# Patient Record
Sex: FEMALE
[De-identification: names, ages, dates, MRNs, and addresses within clinical notes are randomized per-mention and may not be internally consistent; named-entity substitution may affect disease eponyms.]

---

## 2017-12-27 NOTE — PCM.OPNOTE
- General Post-Op/Procedure Note


Date of Surgery/Procedure: 12/27/17


Operative Procedure(s): right carpal tunnel release


Pre Op Diagnosis: right carpal tunnel


Post-Op Diagnosis: Same


Anesthesia Technique: Local, MAC


Primary Surgeon: Airam Lai


Assistant: Kaleigh Maldonado


Complications: None


Condition: Good

## 2017-12-27 NOTE — PCM.PREANE
Preanesthetic Assessment





- Procedure


Proposed Procedure: 





Carpal tunnel decompression, right








- Anesthesia/Transfusion/Family Hx


Anesthesia History: Prior Anesthesia Without Reaction


Family History of Anesthesia Reaction: No


Transfusion History: No Prior Transfusion(s)


Additional History: 





cervical, thoracic, and lumbar pains, chronic and acute








- Review of Systems


General: No Symptoms, Other (obesity)


Pulmonary: No Symptoms


Cardiovascular: No Symptoms


Gastrointestinal: Other (uses nexium)


Neurological: Numbness (right hand), Other (see spinal column problems above)





- Physical Assessment


NPO Status Date: 12/26/17


NPO Status Time: 22:00


Height: 5 ft 4 in


Weight: 235 lb


ASA Class: 3


Mental Status: Alert & Oriented x3


Airway Class: Mallampati = 2


Dentition: Reports: Normal Dentition


Thyro-Mental Finger Breadths: 3 (short neck)


Mouth Opening Finger Breadths: 3


ROM/Head Extension: Limited/Partial


Lungs: Clear to Auscultation, Normal Respiratory Effort


Cardiovascular: Regular Rate, Regular Rhythm, No Murmurs





- Allergies


Allergies/Adverse Reactions: 


 Allergies











Allergy/AdvReac Type Severity Reaction Status Date / Time


 


No Known Allergies Allergy   Verified 11/10/16 17:22














- Blood


Blood Available: No


Product(s) Available: None





- Anesthesia Plan


Pre-Op Medication Ordered: None





- Acknowledgements


Anesthesia Type Planned: MAC


Pt an Appropriate Candidate for the Planned Anesthesia: Yes


Alternatives and Risks of Anesthesia Discussed w Pt/Guardian: Yes


Pt/Guardian Understands and Agrees with Anesthesia Plan: Yes





PreAnesthesia Questionnaire





- Past Health History


Medical/Surgical History: Denies Medical/Surgical History


Gastrointestinal History: Reports: GERD


Genitourinary History: Reports: None


OB/GYN History: Reports: Pregnancy


Musculoskeletal History: Reports: Arthritis, Back Pain, Chronic, Neck Pain, 

Chronic


Other Musculoskeletal History: degenerative disc disease


Neurological History: Reports: Migraines, Seizure, Other (See Below)


Other Neuro History: steroid injections in C-sping, L-spine, seizure 2 years ago


Psychiatric History: Reports: Anxiety, Depression


Endocrine/Metabolic History: Reports: Obesity/BMI 30+


Dermatologic History: Reports: Other (See Below)


Other Dermatologic History: acne





- Infectious Disease History


Infectious Disease History: Reports: Chicken Pox





- Past Surgical History


Head Surgeries/Procedures: Reports: None


Neurological Surgical History: Reports: C-Spine


Other Neurological Surgeries/Procedures: hx neck surgery


Musculoskeletal Surgical History: Reports: Carpal Tunnel





- SUBSTANCE USE


Smoking Status *Q: Former Smoker


Tobacco Use Within Last Twelve Months: Cigarettes


Days Per Week of Alcohol Use: 0


Recreational Drug Use History: No





- HOME MEDS


Home Medications: 


 Home Meds





Cyanocobalamin/FA/Pyridoxine [Folbic Tablet] 1 tab PO BID 12/21/17 [History]


Cyclobenzaprine HCl 10 mg PO BID PRN 12/21/17 [History]


Diclofenac Sodium [Voltaren 1% Gel] 1 applic TOP ASDIRECTED PRN 12/21/17 [

History]


Esomeprazole Magnesium [Nexium] 2 tab PO DAILY 12/21/17 [History]


Ketamine Hcl Powder 1 applic TOP ASDIRECTED PRN 12/21/17 [History]


Minocycline HCl 100 mg PO BID 12/21/17 [History]


Omega-3/DHA/Epa/Fish Oil [Omega 3 500 Softgel] 680 mg PO BID 12/21/17 [History]


Sertraline HCl 50 mg PO DAILY 12/21/17 [History]











- CURRENT (IN HOUSE) MEDS


Current Meds: 





 Current Medications





Hydrocodone Bitart/Acetaminophen (Norco 325-5 Mg)  1 tab PO Q4H PRN


   PRN Reason: Pain


Lactated Ringer's (Ringers, Lactated)  1,000 mls @ 125 mls/hr IV ASDIRECTED DANNIE





Discontinued Medications





Bupivacaine HCl/Epinephrine Bitart (Marcaine 0.25%/Epinephrine 1:200,000)  10 

ml INJECT ONETIME ONE


   Stop: 12/27/17 08:02


Bupivacaine HCl/Epinephrine Bitart (Sensorc Mpf 0.25%-Epi 1:235217) Confirm 

Administered Dose 30 mls @ as directed .ROUTE .STK-MED ONE


   Stop: 12/27/17 07:20


Cefazolin Sodium/Dextrose 2 gm (/ Premix)  50 mls @ 100 mls/hr IV ONETIME ONE


   Stop: 12/27/17 08:30

## 2017-12-27 NOTE — PCM48HPAN
Post Anesthesia Note





- EVALUATION WITHIN 48HRS OF ANESTHETIC


Vital Signs in Normal Range: Yes


Patient Participated in Evaluation: Yes


Respiratory Function Stable: Yes


Airway Patent: Yes


Cardiovascular Function Stable: Yes


Hydration Status Stable: Yes


Pain Control Satisfactory: Yes


Nausea and Vomiting Control Satisfactory: Yes


Mental Status Recovered: Yes





- COMMENTS/OBSERVATIONS


Free Text/Narrative:: 





Comfortable, warm pink and dry, leaving

## 2017-12-27 NOTE — PCM.POSTAN
POST ANESTHESIA ASSESSMENT





- MENTAL STATUS


Mental Status: Alert, Oriented





- VITAL SIGNS


Pulse Rate: 85


SaO2: 95 (room air)


Resp Rate: 12


Blood Pressure: 125/73





- RESPIRATORY


Respiratory Status: Respiratory Rate WNL, Airway Patent, O2 Saturation Stable





- CARDIOVASCULAR


CV Status: Pulse Rate WNL, Blood Pressure Stable





- GASTROINTESTINAL


GI Status: No Symptoms





- PAIN


Pain Score: 0





- POST OP HYDRATION


Hydration Status: Adequate & Stable

## 2017-12-28 NOTE — OR
SURGEON:

TING LARIOS MD

 

DATE OF PROCEDURE:  12/27/2017

 

PREOPERATIVE DIAGNOSIS:

Right carpal tunnel syndrome.

 

POSTOPERATIVE DIAGNOSIS:

Right carpal tunnel syndrome.

 

PROCEDURE:

Right carpal tunnel release.

 

ASSISTANT:

ROXANNE Bradford

 

INDICATIONS:

Ms. Holland is a 37-year-old female with carpal tunnel syndrome on the right.

Risks and benefits of release were discussed with her and she was in agreement

to proceed.  Risks were including, but not limited to, bleeding, infection,

damage to underlying or overlying structures, possible need for future

interventions, possible scarring.

 

PROCEDURE IN DETAIL:

After informed consent was obtained and placed on the chart, the patient was

brought to the operating theater and laid in supine position.  After adequate

local MAC anesthetic was obtained, the area was prepped and draped and a time-

out was completed to confirm side and site.

 

The arm was exsanguinated and tourniquet was inflated to 200 mmHg.  Dissection

was then taken on the transverse carpal ligament area and a 15 blade was used to

dissect through skin and subcutaneous tissues until reach of the ligament.

Dissection was then carried distally and proximally under direct visualization

until complete release of the ligament.  The wound was copiously irrigated and

closed using 5-0 nylon stitch in a horizontal mattress fashion.  She tolerated

this well.  All counts and needles were correct at the end of the case.

 

The wound was dressed with Xeroform, fluffs, and a Kerlix gauze dressing, and a

2-inch Ace wrap.

 

FOLLOWUP INSTRUCTIONS:

The patient will see us in 10 to 14 days, sooner if any problems, questions, or

concerns and was given a prescription for pain control.

 

 

JAVAD / BRENNON

DD:  12/28/2017 17:13:50

DT:  12/28/2017 20:34:05

Job #:  [id number]

## 2021-04-11 ENCOUNTER — HOSPITAL ENCOUNTER (EMERGENCY)
Dept: HOSPITAL 56 - MW.ED | Age: 41
Discharge: HOME | End: 2021-04-11
Payer: COMMERCIAL

## 2021-04-11 VITALS — DIASTOLIC BLOOD PRESSURE: 71 MMHG | HEART RATE: 83 BPM | SYSTOLIC BLOOD PRESSURE: 128 MMHG

## 2021-04-11 DIAGNOSIS — E66.9: ICD-10-CM

## 2021-04-11 DIAGNOSIS — K92.2: Primary | ICD-10-CM

## 2021-04-11 LAB
BUN SERPL-MCNC: 10 MG/DL (ref 7–18)
CHLORIDE SERPL-SCNC: 103 MMOL/L (ref 98–107)
CO2 SERPL-SCNC: 26.6 MMOL/L (ref 21–32)
GLUCOSE SERPL-MCNC: 87 MG/DL (ref 74–106)
LIPASE SERPL-CCNC: 132 U/L (ref 73–393)
POTASSIUM SERPL-SCNC: 4.1 MMOL/L (ref 3.5–5.1)
SODIUM SERPL-SCNC: 140 MMOL/L (ref 136–145)

## 2021-04-11 PROCEDURE — 81003 URINALYSIS AUTO W/O SCOPE: CPT

## 2021-04-11 PROCEDURE — 83735 ASSAY OF MAGNESIUM: CPT

## 2021-04-11 PROCEDURE — 83690 ASSAY OF LIPASE: CPT

## 2021-04-11 PROCEDURE — 85025 COMPLETE CBC W/AUTO DIFF WBC: CPT

## 2021-04-11 PROCEDURE — 80053 COMPREHEN METABOLIC PANEL: CPT

## 2021-04-11 PROCEDURE — 99285 EMERGENCY DEPT VISIT HI MDM: CPT

## 2021-04-11 PROCEDURE — 85610 PROTHROMBIN TIME: CPT

## 2021-04-11 PROCEDURE — 36415 COLL VENOUS BLD VENIPUNCTURE: CPT

## 2021-04-11 PROCEDURE — 85730 THROMBOPLASTIN TIME PARTIAL: CPT

## 2021-04-11 PROCEDURE — 84703 CHORIONIC GONADOTROPIN ASSAY: CPT

## 2021-04-11 PROCEDURE — 74177 CT ABD & PELVIS W/CONTRAST: CPT

## 2021-04-11 NOTE — CT
Indication:



 Left upper and left lower quadrant pain. 



Technique:



 Contrast enhanced CT abdomen and pelvis with 100 mL Isovue 370 



Comparison:



 No comparison 



. 



Findings:



The heart size is normal. Lung bases are clear. 



Fatty appearance of the liver. Gallbladder spleen pancreas adrenal glands 

are unremarkable. Normal caliber abdominal aorta. 



Symmetric enhancement both kidneys normal appendix. Urinary bladder is 

unremarkable. Bowel is unremarkable. No inflammatory change. No suspicious 

bony lesions. 



Impression:



 1. No acute findings in the abdomen or pelvis.



Please note that all CT scans at this facility use dose modulation, 

iterative reconstruction, and/or weight-based dosing when appropriate to 

reduce radiation dose to as low as reasonably achievable.



Dictated by Debbi Dowd MD @ Apr 11 2021  4:52PM



Signed by Dr. Debbi Dowd @ Apr 11 2021  4:56PM

## 2021-04-11 NOTE — EDM.PDOC
ED HPI GENERAL MEDICAL PROBLEM





- General


Chief Complaint: Abdominal Pain


Stated Complaint: NOT FEELING WELL


Time Seen by Provider: 04/11/21 13:15


Source of Information: Reports: Patient


History Limitations: Reports: No Limitations





- History of Present Illness


INITIAL COMMENTS - FREE TEXT/NARRATIVE: 





Is a 41-year-old female who presents today for abdominal cramps and bloody stool

.  Patient has history of hemorrhoids and occasionally has bright red blood in 

her stool without that the blood may look little different than before.  Patient

reports that she does feel little tired than normal but denies feeling pale 

having vomiting or other complaints.  Patient denies being on any blood 

thinners.


  ** abdomen


Pain Score (Numeric/FACES): 2





- Related Data


                                    Allergies











Allergy/AdvReac Type Severity Reaction Status Date / Time


 


No Known Allergies Allergy   Verified 04/11/21 14:57











Home Meds: 


                                    Home Meds





. [No Known Home Meds]  04/11/21 [History]











Past Medical History





- Past Health History


Medical/Surgical History: Denies Medical/Surgical History


Gastrointestinal History: Reports: GERD


Genitourinary History: Reports: None


OB/GYN History: Reports: Pregnancy


Musculoskeletal History: Reports: Arthritis, Back Pain, Chronic, Neck Pain, 

Chronic


Other Musculoskeletal History: degenerative disc disease


Neurological History: Reports: Migraines, Seizure, Other (See Below)


Other Neuro History: steroid injections in C-sping, L-spine, seizure 2 years ago


Psychiatric History: Reports: Anxiety, Depression


Endocrine/Metabolic History: Reports: Obesity/BMI 30+


Dermatologic History: Reports: Other (See Below)


Other Dermatologic History: acne





- Infectious Disease History


Infectious Disease History: Reports: Chicken Pox





- Past Surgical History


Head Surgeries/Procedures: Reports: None


Neurological Surgical History: Reports: C-Spine


Other Neurological Surgeries/Procedures: hx neck surgery


Musculoskeletal Surgical History: Reports: Carpal Tunnel





Social & Family History





- Family History


Family Medical History: No Pertinent Family History


Respiratory: Reports: TB





- Caffeine Use


Caffeine Use: Reports: Coffee





ED ROS GENERAL





- Review of Systems


Review Of Systems: See Below


Constitutional: Reports: No Symptoms


HEENT: Reports: No Symptoms


Respiratory: Reports: No Symptoms


Cardiovascular: Reports: No Symptoms


Endocrine: Reports: No Symptoms


GI/Abdominal: Reports: Bloody Stool


: Reports: No Symptoms


Musculoskeletal: Reports: No Symptoms


Skin: Reports: No Symptoms


Neurological: Reports: No Symptoms


Psychiatric: Reports: No Symptoms


Hematologic/Lymphatic: Reports: No Symptoms


Immunologic: Reports: No Symptoms





ED EXAM, GI/ABD





- Physical Exam


Exam: See Below


Exam Limited By: No Limitations


General Appearance: Alert, WD/WN


Respiratory/Chest: No Respiratory Distress, Lungs Clear, Normal Breath Sounds


Cardiovascular: Normal Peripheral Pulses, Regular Rate, Rhythm


GI/Abdominal Exam: Normal Bowel Sounds, Soft, Non-Tender


Extremities: Normal Inspection, Normal Range of Motion


Neurological: Alert, Oriented, Normal Cognition





Course





- Vital Signs


Last Recorded V/S: 


                                Last Vital Signs











Temp  97 F   04/11/21 13:00


 


Pulse  84   04/11/21 15:00


 


Resp  16   04/11/21 15:00


 


BP  133/87   04/11/21 15:00


 


Pulse Ox  99   04/11/21 15:00














- Orders/Labs/Meds


Labs: 


                                Laboratory Tests











  04/11/21 04/11/21 04/11/21 Range/Units





  13:28 13:28 13:28 


 


WBC  10.89    (4.0-11.0)  K/uL


 


RBC  4.96    (4.30-5.90)  M/uL


 


Hgb  13.7    (12.0-16.0)  g/dL


 


Hct  43.0    (36.0-46.0)  %


 


MCV  86.7    (80.0-98.0)  fL


 


MCH  27.6    (27.0-32.0)  pg


 


MCHC  31.9    (31.0-37.0)  g/dL


 


RDW Std Deviation  46.2    (28.0-62.0)  fl


 


RDW Coeff of Romaine  15    (11.0-15.0)  %


 


Plt Count  382    (150-400)  K/uL


 


MPV  10.30    (7.40-12.00)  fL


 


Neut % (Auto)  56.2    (48.0-80.0)  %


 


Lymph % (Auto)  33.8    (16.0-40.0)  %


 


Mono % (Auto)  8.4    (0.0-15.0)  %


 


Eos % (Auto)  1.2    (0.0-7.0)  %


 


Baso % (Auto)  0.4    (0.0-1.5)  %


 


Neut # (Auto)  6.1 H    (1.4-5.7)  K/uL


 


Lymph # (Auto)  3.7 H    (0.6-2.4)  K/uL


 


Mono # (Auto)  0.9 H    (0.0-0.8)  K/uL


 


Eos # (Auto)  0.1    (0.0-0.7)  K/uL


 


Baso # (Auto)  0.0    (0.0-0.1)  K/uL


 


Nucleated RBC %  0.0    /100WBC


 


Nucleated RBCs #  0    K/uL


 


INR     


 


APTT     (18.6-31.3)  SEC


 


Sodium   140   (136-145)  mmol/L


 


Potassium   4.1   (3.5-5.1)  mmol/L


 


Chloride   103   ()  mmol/L


 


Carbon Dioxide   26.6   (21.0-32.0)  mmol/L


 


BUN   10   (7.0-18.0)  mg/dL


 


Creatinine   0.8   (0.6-1.0)  mg/dL


 


Est Cr Clr Drug Dosing   TNP   


 


Estimated GFR (MDRD)   > 60.0   ml/min


 


Glucose   87   ()  mg/dL


 


Calcium   9.0   (8.5-10.1)  mg/dL


 


Magnesium   2.1   (1.8-2.4)  mg/dL


 


Total Bilirubin   0.3   (0.2-1.0)  mg/dL


 


AST   17   (15-37)  IU/L


 


ALT   35   (14-63)  IU/L


 


Alkaline Phosphatase   70   ()  U/L


 


Total Protein   7.9   (6.4-8.2)  g/dL


 


Albumin   4.1   (3.4-5.0)  g/dL


 


Globulin   3.8   (2.6-4.0)  g/dL


 


Albumin/Globulin Ratio   1.1   (0.9-1.6)  


 


Lipase   132   ()  U/L


 


HCG, Qual    NEGATIVE  (NEG)  


 


Urine Color     


 


Urine Appearance     


 


Urine pH     (5.0-8.0)  


 


Ur Specific Gravity     (1.001-1.035)  


 


Urine Protein     (NEGATIVE)  mg/dL


 


Urine Glucose (UA)     (NEGATIVE)  mg/dL


 


Urine Ketones     (NEGATIVE)  mg/dL


 


Urine Occult Blood     (NEGATIVE)  


 


Urine Nitrite     (NEGATIVE)  


 


Urine Bilirubin     (NEGATIVE)  


 


Urine Urobilinogen     (<2.0)  EU/dL


 


Ur Leukocyte Esterase     (NEGATIVE)  














  04/11/21 04/11/21 Range/Units





  13:52 15:04 


 


WBC    (4.0-11.0)  K/uL


 


RBC    (4.30-5.90)  M/uL


 


Hgb    (12.0-16.0)  g/dL


 


Hct    (36.0-46.0)  %


 


MCV    (80.0-98.0)  fL


 


MCH    (27.0-32.0)  pg


 


MCHC    (31.0-37.0)  g/dL


 


RDW Std Deviation    (28.0-62.0)  fl


 


RDW Coeff of Romaine    (11.0-15.0)  %


 


Plt Count    (150-400)  K/uL


 


MPV    (7.40-12.00)  fL


 


Neut % (Auto)    (48.0-80.0)  %


 


Lymph % (Auto)    (16.0-40.0)  %


 


Mono % (Auto)    (0.0-15.0)  %


 


Eos % (Auto)    (0.0-7.0)  %


 


Baso % (Auto)    (0.0-1.5)  %


 


Neut # (Auto)    (1.4-5.7)  K/uL


 


Lymph # (Auto)    (0.6-2.4)  K/uL


 


Mono # (Auto)    (0.0-0.8)  K/uL


 


Eos # (Auto)    (0.0-0.7)  K/uL


 


Baso # (Auto)    (0.0-0.1)  K/uL


 


Nucleated RBC %    /100WBC


 


Nucleated RBCs #    K/uL


 


INR  0.98   


 


APTT  24.8   (18.6-31.3)  SEC


 


Sodium    (136-145)  mmol/L


 


Potassium    (3.5-5.1)  mmol/L


 


Chloride    ()  mmol/L


 


Carbon Dioxide    (21.0-32.0)  mmol/L


 


BUN    (7.0-18.0)  mg/dL


 


Creatinine    (0.6-1.0)  mg/dL


 


Est Cr Clr Drug Dosing    


 


Estimated GFR (MDRD)    ml/min


 


Glucose    ()  mg/dL


 


Calcium    (8.5-10.1)  mg/dL


 


Magnesium    (1.8-2.4)  mg/dL


 


Total Bilirubin    (0.2-1.0)  mg/dL


 


AST    (15-37)  IU/L


 


ALT    (14-63)  IU/L


 


Alkaline Phosphatase    ()  U/L


 


Total Protein    (6.4-8.2)  g/dL


 


Albumin    (3.4-5.0)  g/dL


 


Globulin    (2.6-4.0)  g/dL


 


Albumin/Globulin Ratio    (0.9-1.6)  


 


Lipase    ()  U/L


 


HCG, Qual    (NEG)  


 


Urine Color   YELLOW  


 


Urine Appearance   CLEAR  


 


Urine pH   5.5  (5.0-8.0)  


 


Ur Specific Gravity   >= 1.030  (1.001-1.035)  


 


Urine Protein   NEGATIVE  (NEGATIVE)  mg/dL


 


Urine Glucose (UA)   NEGATIVE  (NEGATIVE)  mg/dL


 


Urine Ketones   NEGATIVE  (NEGATIVE)  mg/dL


 


Urine Occult Blood   NEGATIVE  (NEGATIVE)  


 


Urine Nitrite   NEGATIVE  (NEGATIVE)  


 


Urine Bilirubin   NEGATIVE  (NEGATIVE)  


 


Urine Urobilinogen   0.2  (<2.0)  EU/dL


 


Ur Leukocyte Esterase   NEGATIVE  (NEGATIVE)  











Meds: 


Medications














Discontinued Medications














Generic Name Dose Route Start Last Admin





  Trade Name Freq  PRN Reason Stop Dose Admin


 


Iopamidol  100 ml  04/11/21 15:41  04/11/21 15:41





  Iopamidol 755 Mg/Ml 500 Ml Multipack Bottle  IVPUSH  04/11/21 15:42  100 ml





  ONETIME ONE   Administration














- Re-Assessments/Exams


Free Text/Narrative Re-Assessment/Exam: 





04/11/21 17:01


Patient hemoglobin is stable.  Patient had no bright red blood or dark stool on 

rectal exam was guaiac positive.  Patient CT is negative.  Patient will be 

referred to follow-up with GI as outpatient.





Departure





- Departure


Time of Disposition: 17:02


Disposition: Home, Self-Care 01


Condition: Good


Clinical Impression: 


 GI bleed








- Discharge Information


*PRESCRIPTION DRUG MONITORING PROGRAM REVIEWED*: Not Applicable


*COPY OF PRESCRIPTION DRUG MONITORING REPORT IN PATIENT MILO: Not Applicable


Instructions:  Gastrointestinal Bleeding, Easy-to-Read


Referrals: 


PCP,None [Primary Care Provider] - 


Forms:  ED Department Discharge


Additional Instructions: 


The following information is given to patients seen in the emergency department 

who are being discharged to home. This information is to outline your options 

for follow-up care. We provide all patients seen in our emergency department 

with a follow-up referral.





The need for follow-up, as well as the timing and circumstances, are variable 

depending upon the specifics of your emergency department visit.





If you don't have a primary care physician on staff, we will provide you with a 

referral. We always advise you to contact your personal physician following an 

emergency department visit to inform them of the circumstance of the visit and 

for follow-up with them and/or the need for any referrals to a consulting 

specialist.





The emergency department will also refer you to a specialist when appropriate. 

This referral assures that you have the opportunity for follow-up care with a 

specialist. All of these measure are taken in an effort to provide you with 

optimal care, which includes your follow-up.





Under all circumstances we always encourage you to contact your private 

physician who remains a resource for coordinating your care. When calling for 

follow-up care, please make the office aware that this follow-up is from your 

recent emergency room visit. If for any reason you are refused follow-up, please

contact the [facility] Emergency Department

at [phone] and asked to speak to the emergency department charge nurse.





Please follow up with your primary care physician. If you do not have a primary 

care physician, see below:





[facility]


[address], [address]


[place] [address]


Phone: [phone]





Please call the number above and follow-up for your possible blood in your 

stool.  If you have any increased bleeding weakness or other complaints please 

return to the ED.  We wilton your labs your hemoglobin is stable and all your 

vitals are stable as well and your CAT scan was normal.








Sepsis Event Note (ED)





- Focused Exam


Vital Signs: 


                                   Vital Signs











  Temp Pulse Resp BP Pulse Ox


 


 04/11/21 15:00   84  16  133/87  99


 


 04/11/21 13:00  97 F  91  17  139/109 H  97














- Assessment/Plan


Plan: 





Patient is a 41-year-old female who presents today for possible blood in stool. 

Patient does have a history of hemorrhoids.  The bleeding could be coming from 

there but she feels that the blood is a different color than what it normally 

is.  Will obtain labs for next hemoglobin and reassess.

## 2021-04-21 ENCOUNTER — HOSPITAL ENCOUNTER (OUTPATIENT)
Dept: HOSPITAL 56 - MW.SDS | Age: 41
Discharge: HOME | End: 2021-04-21
Attending: SURGERY
Payer: COMMERCIAL

## 2021-04-21 VITALS — DIASTOLIC BLOOD PRESSURE: 70 MMHG | SYSTOLIC BLOOD PRESSURE: 119 MMHG | HEART RATE: 66 BPM

## 2021-04-21 DIAGNOSIS — M79.18: ICD-10-CM

## 2021-04-21 DIAGNOSIS — Z98.890: ICD-10-CM

## 2021-04-21 DIAGNOSIS — Z79.899: ICD-10-CM

## 2021-04-21 DIAGNOSIS — K92.1: Primary | ICD-10-CM

## 2021-04-21 DIAGNOSIS — K22.8: ICD-10-CM

## 2021-04-21 DIAGNOSIS — K29.50: ICD-10-CM

## 2021-04-21 DIAGNOSIS — E66.9: ICD-10-CM

## 2021-04-21 DIAGNOSIS — F17.200: ICD-10-CM

## 2021-04-21 DIAGNOSIS — K64.4: ICD-10-CM

## 2021-04-21 DIAGNOSIS — K44.9: ICD-10-CM

## 2021-04-21 DIAGNOSIS — G89.29: ICD-10-CM

## 2021-04-21 PROCEDURE — 45378 DIAGNOSTIC COLONOSCOPY: CPT

## 2021-04-21 PROCEDURE — 81025 URINE PREGNANCY TEST: CPT

## 2021-04-21 PROCEDURE — 43239 EGD BIOPSY SINGLE/MULTIPLE: CPT

## 2021-04-21 NOTE — OR
SURGEON:

ANNA YARBROUGH MD

 

DATE OF PROCEDURE:  04/21/2021

 

PREOPERATIVE DIAGNOSES:

1. Blood in the stool.

2. Left upper abdominal pain.

 

POSTOPERATIVE DIAGNOSES:

1. Minimal gastritis.

2. Small sliding hiatal hernia.

3. Irregular GE junction.

4. Hemorrhoids.

 

PROCEDURES PERFORMED:

1. EGD with biopsies.

2. Colonoscopy.

 

ANESTHESIA:

With anesthesiology.

 

Extent of colonoscopy was to the cecum.  Extent of the EGD was to at least the

second part of the duodenum.

 

BOWEL PREP:

Very good.

 

LIMITATIONS:

None.

 

REASON FOR PROCEDURE:

The patient is a pleasant 41-year-old female who says that she __________ blood

when she wipes.  She relates this to hemorrhoids she has had last 19 years.

Currently she is not really interested in fixing these, however, a couple of

weeks ago, she noticed the blood had changed color.  She is also getting some

left upper abdominal pain and discomfort, soreness like she has been doing a lot

of sit-ups and crunches.  She has not had a colonoscopy before.  She denies any

family history of colon cancer.  The patient notes she does have heartburn, but

this is usually fairly controlled with Nexium, although occasionally she gets

some breakthrough GERD.

 

PROCEDURE IN DETAIL:

Physical exam was performed.  The major risks and benefits associated with the

procedure were explained to the patient.  The patient verbalized understanding

and is agreement with the same. The patient was connected to appropriate devices

and IV started. EKG, pulse, pulse oximetry, blood pressure, and capnography were

monitored throughout the entire procedure.  Continuous oxygen and sedation were

provided by the anesthesiologist.

 

The patient was placed in left lateral decubitus position.  Sedation began.

After adequate sedation achieved, upper endoscope was advanced without

difficulty in the upper GI tract, the mucosa of the esophagus, GE junction,

stomach, pylorus, and at least the second part of the duodenum were all

inspected.  Duodenum appeared normal.  Scope was brought onto the stomach.  Both

retro and antegrade views of the stomach were done.  The patient had some very

minimal gastritis down in the antrum.  Biopsies were taken of the pylorus and

antrum to check for H pylori.  Scope was brought to the GE junction.  The

patient did appear to have a very small, less than a centimeter sliding hiatal

hernia.  GE junction was approximately 36 cm from incisors.  GE junction was

slightly irregular, so four-quadrant biopsies were taken.  The scope was then

brought in the stomach.  The stomach was desufflated.  Scope was brought out

through the esophagus.  Esophagus appeared normal.  Scope was completely removed

and this part of the procedure was terminated.

 

Gloves and scopes were changed.

 

Now rectal examination was done.  No rectal masses or polyps were felt.  The

patient did have some external hemorrhoidal skin tissues, but they were

noninflamed.  Now, a well-lubricated Olympus colonoscope was entered in the

rectum and advanced under direct visualization of the cecum.  Cecum was

identified by both visual and anatomic landmarks.  Photographs were taken of the

cecal cap.  The patient did have a somewhat floppy colon, did need a little bit

external abdominal pressure to get to the cecum.  The cecum was intubated.  The

scope was then slowly withdrawn in somewhat circular fashion looking at the

color, texture, anatomy, and integrity of the mucosa from the cecum to the anal

canal.  The patient did have some liquid stool which was suctioned and irrigated

out for good look at the mucosa.  No polyps or masses were seen.  Scope was

retroflexed in the rectum.  Scope was completely removed and procedure was

terminated.

 

ENDOSCOPIC DIAGNOSES:

1. Minimal gastritis.

2. Small hiatal hernia.

3. Irregular gastroesophageal junction.

4. Hemorrhoids.

 

RECOMMENDATIONS:

Followup colonoscopy to be in 10 years, sooner if she develops signs and

symptoms such as change in bowel habits or changing blood and stool.

 

The patient is to follow up with me in clinic to go over her GE biopsies.  The

patient will continue her antacids.

 

 

JARED / BRENNON

DD:  04/21/2021 14:01:16

DT:  04/21/2021 21:24:09

Job #:  [id number]

## 2021-04-21 NOTE — PCM.PREANE
Preanesthetic Assessment





- Anesthesia/Transfusion/Family Hx


Anesthesia History: Prior Anesthesia Without Reaction


Family History of Anesthesia Reaction: No


Transfusion History: No Prior Transfusion(s)





- Review of Systems


General: No Symptoms


Pulmonary: No Symptoms


Cardiovascular: No Symptoms


Gastrointestinal: No Symptoms


Neurological: No Symptoms


Other: Reports: None





- Physical Assessment


NPO Status Date: 04/21/21


NPO Status Time: 00:01


Height: 5 ft 4 in


Weight: 223 lb


ASA Class: 2


Mental Status: Alert & Oriented x3


Airway Class: Mallampati = 2


Dentition: Reports: Normal Dentition


ROM/Head Extension: Limited/Partial


Lungs: Clear to Auscultation, Normal Respiratory Effort


Cardiovascular: Regular Rate, Regular Rhythm





- Lab


Values: 





                             Laboratory Last Values











Urine HCG, Qual  NEGATIVE  (NEGATIVE)   04/21/21  10:32    














- Allergies


Allergies/Adverse Reactions: 


                                    Allergies











Allergy/AdvReac Type Severity Reaction Status Date / Time


 


No Known Allergies Allergy   Verified 04/15/21 14:13














- Acknowledgements


Anesthesia Type Planned: General Anesthesia


Pt an Appropriate Candidate for the Planned Anesthesia: Yes


Alternatives and Risks of Anesthesia Discussed w Pt/Guardian: Yes


Additional Comments: 





NPO


TOB  1-2 PPD


ETOH NONE


PCOS


C6-7 fusion 2006 without radiculopathy


chronic LBP - no narcotics, prn tylenol


anxiety depression


nancy


obesity  bmi  38


hx seizure X 1 (2013)- pt started jerking during the night while sleeping and 

was taken to ER.  EEG told was unremarkable but she was started on anti-seizures

meds . She self weaned herself off this med 6 months later and has had no re

currence.


par no questions








PreAnesthesia Questionnaire





- Past Health History


Medical/Surgical History: Denies Medical/Surgical History


HEENT History: Reports: Other (See Below)


Other HEENT History: uses reading glasses


Cardiovascular History: Reports: None


Respiratory History: Reports: None


Gastrointestinal History: Reports: GERD


Genitourinary History: Reports: None


OB/GYN History: Reports: Polycystic Ovaries, Pregnancy


Musculoskeletal History: Reports: Back Pain, Chronic, Fracture, Neck Pain, 

Chronic


Other Musculoskeletal History: hx of fx finger


Neurological History: Reports: Headaches, Chronic, Seizure


Other Neuro History: hx of 1 unexplained seizure in 2014- no treatment,  hx of 

chronic headaches in the past from neck pain,  hx of degenerative disc disease


Psychiatric History: Reports: None


Endocrine/Metabolic History: Reports: Obesity/BMI 30+


Hematologic History: Reports: None


Immunologic History: Reports: None


Oncologic (Cancer) History: Reports: None


Dermatologic History: Reports: None


Other Dermatologic History: acne





- Infectious Disease History


Infectious Disease History: Reports: Chicken Pox





- Past Surgical History


Head Surgeries/Procedures: Reports: None


HEENT Surgical History: Reports: None


Cardiovascular Surgical History: Reports: None


Respiratory Surgical History: Reports: None


GI Surgical History: Reports: None


Female  Surgical History: Reports: None


Endocrine Surgical History: Reports: None


Neurological Surgical History: Reports: C-Spine, Spinal Fusion


Musculoskeletal Surgical History: Reports: Carpal Tunnel


Other Musculoskeletal Surgeries/Procedures:: bilateral CTR





- SUBSTANCE USE


Tobacco Use Status *Q: Current Every Day Tobacco User


Tobacco Use Within Last Twelve Months: Cigarettes


Recreational Drug Use History: No





- HOME MEDS


Home Medications: 


                                    Home Meds





Esomeprazole Magnesium [Nexium 24Hr] 40 mg PO DAILY 04/15/21 [History]











- CURRENT (IN HOUSE) MEDS


Current Meds: 





                               Current Medications





Lactated Ringer's (Ringers, Lactated)  1,000 mls @ 125 mls/hr IV ASDIRECTED DANNIE





Discontinued Medications





Fentanyl (Fentanyl 100 Mcg/2 Ml Sdv) Confirm Administered Dose 100 mcg .ROUTE 

.STK-MED ONE


   Stop: 04/21/21 07:16


Midazolam HCl (Midazolam 1 Mg/Ml 2 Ml Sdv) Confirm Administered Dose 2 mg .ROUTE

 .STK-MED ONE


   Stop: 04/21/21 07:16


Ondansetron HCl (Ondansetron 4 Mg/2 Ml Sdv) Confirm Administered Dose 4 mg 

.ROUTE .STK-MED ONE


   Stop: 04/21/21 07:16


Propofol (Propofol 200 Mg/20 Ml Sdv) Confirm Administered Dose 400 mg .ROUTE 

.STK-MED ONE


   Stop: 04/21/21 07:16

## 2021-04-21 NOTE — PCM.OPNOTE
- General Post-Op/Procedure Note


Date of Surgery/Procedure: 04/21/21


Operative Procedure(s): EGD with biopsies.  Colonoscopy


Findings: 





Gastritis


Small hiatal hernia


irregular GE junction


Hemorrhoids 


Dictation number #[id number]


Pre Op Diagnosis: blood in stool


Post-Op Diagnosis: Gastritis.  Small hiatal hernia.  irregular GE junction.  

Hemorrhoids


Primary Surgeon: John Okeefe


Pathology: 





GED biopsies


Complications: None


Condition: Good


Free Text/Narrative:: 


                                 Intake & Output











 04/20/21 04/21/21 04/21/21





 22:59 06:59 14:59


 


Intake Total   750


 


Balance   750

## 2021-04-21 NOTE — PCM.POSTAN
POST ANESTHESIA ASSESSMENT





- MENTAL STATUS


Mental Status: Alert (no anesthetic problems), Oriented





- RESPIRATORY


Respiratory Status: Respiratory Rate WNL, Airway Patent, O2 Saturation Stable





- CARDIOVASCULAR


CV Status: Pulse Rate WNL, Blood Pressure Stable





- GASTROINTESTINAL


GI Status: No Symptoms





- POST OP HYDRATION


Hydration Status: Adequate & Stable